# Patient Record
Sex: MALE | Race: WHITE | NOT HISPANIC OR LATINO | URBAN - METROPOLITAN AREA
[De-identification: names, ages, dates, MRNs, and addresses within clinical notes are randomized per-mention and may not be internally consistent; named-entity substitution may affect disease eponyms.]

---

## 2020-11-12 ENCOUNTER — INPATIENT (INPATIENT)
Facility: HOSPITAL | Age: 55
LOS: 5 days | Discharge: HOME | DRG: 177 | End: 2020-11-18
Attending: HOSPITALIST | Admitting: STUDENT IN AN ORGANIZED HEALTH CARE EDUCATION/TRAINING PROGRAM
Payer: COMMERCIAL

## 2020-11-12 VITALS — WEIGHT: 199.96 LBS | HEIGHT: 69 IN

## 2020-11-12 DIAGNOSIS — U07.1 COVID-19: ICD-10-CM

## 2020-11-12 LAB
ALBUMIN SERPL ELPH-MCNC: 3.4 G/DL — SIGNIFICANT CHANGE UP (ref 3.3–5)
ALP SERPL-CCNC: 55 U/L — SIGNIFICANT CHANGE UP (ref 40–120)
ALT FLD-CCNC: 30 U/L — SIGNIFICANT CHANGE UP (ref 10–45)
ANION GAP SERPL CALC-SCNC: 11 MMOL/L — SIGNIFICANT CHANGE UP (ref 5–17)
AST SERPL-CCNC: 37 U/L — SIGNIFICANT CHANGE UP (ref 10–40)
BASOPHILS # BLD AUTO: 0.01 K/UL — SIGNIFICANT CHANGE UP (ref 0–0.2)
BASOPHILS NFR BLD AUTO: 0.2 % — SIGNIFICANT CHANGE UP (ref 0–2)
BILIRUB SERPL-MCNC: 0.4 MG/DL — SIGNIFICANT CHANGE UP (ref 0.2–1.2)
BUN SERPL-MCNC: 13 MG/DL — SIGNIFICANT CHANGE UP (ref 7–23)
CALCIUM SERPL-MCNC: 8.4 MG/DL — SIGNIFICANT CHANGE UP (ref 8.4–10.5)
CHLORIDE SERPL-SCNC: 98 MMOL/L — SIGNIFICANT CHANGE UP (ref 96–108)
CO2 BLDA-SCNC: 21 MMOL/L — LOW (ref 22–30)
CO2 SERPL-SCNC: 26 MMOL/L — SIGNIFICANT CHANGE UP (ref 22–31)
CREAT SERPL-MCNC: 1.35 MG/DL — HIGH (ref 0.5–1.3)
D DIMER BLD IA.RAPID-MCNC: 222 NG/ML DDU — SIGNIFICANT CHANGE UP
EOSINOPHIL # BLD AUTO: 0.11 K/UL — SIGNIFICANT CHANGE UP (ref 0–0.5)
EOSINOPHIL NFR BLD AUTO: 2 % — SIGNIFICANT CHANGE UP (ref 0–6)
GAS PNL BLDA: SIGNIFICANT CHANGE UP
GLUCOSE SERPL-MCNC: 119 MG/DL — HIGH (ref 70–99)
HCT VFR BLD CALC: 38.7 % — LOW (ref 39–50)
HGB BLD-MCNC: 13.4 G/DL — SIGNIFICANT CHANGE UP (ref 13–17)
HOROWITZ INDEX BLDA+IHG-RTO: SIGNIFICANT CHANGE UP
IMM GRANULOCYTES NFR BLD AUTO: 0.4 % — SIGNIFICANT CHANGE UP (ref 0–1.5)
LACTATE SERPL-SCNC: 0.9 MMOL/L — SIGNIFICANT CHANGE UP (ref 0.7–2)
LYMPHOCYTES # BLD AUTO: 0.43 K/UL — LOW (ref 1–3.3)
LYMPHOCYTES # BLD AUTO: 7.9 % — LOW (ref 13–44)
MCHC RBC-ENTMCNC: 32.1 PG — SIGNIFICANT CHANGE UP (ref 27–34)
MCHC RBC-ENTMCNC: 34.6 GM/DL — SIGNIFICANT CHANGE UP (ref 32–36)
MCV RBC AUTO: 92.8 FL — SIGNIFICANT CHANGE UP (ref 80–100)
MONOCYTES # BLD AUTO: 0.16 K/UL — SIGNIFICANT CHANGE UP (ref 0–0.9)
MONOCYTES NFR BLD AUTO: 2.9 % — SIGNIFICANT CHANGE UP (ref 2–14)
NEUTROPHILS # BLD AUTO: 4.71 K/UL — SIGNIFICANT CHANGE UP (ref 1.8–7.4)
NEUTROPHILS NFR BLD AUTO: 86.6 % — HIGH (ref 43–77)
NRBC # BLD: 0 /100 WBCS — SIGNIFICANT CHANGE UP (ref 0–0)
NT-PROBNP SERPL-SCNC: 103 PG/ML — SIGNIFICANT CHANGE UP (ref 0–300)
PCO2 BLDA: 25 MMHG — LOW (ref 32–46)
PH BLDA: 7.52 — HIGH (ref 7.35–7.45)
PLATELET # BLD AUTO: 130 K/UL — LOW (ref 150–400)
PO2 BLDA: <44 MMHG — CRITICAL LOW (ref 74–108)
POTASSIUM SERPL-MCNC: 3.8 MMOL/L — SIGNIFICANT CHANGE UP (ref 3.5–5.3)
POTASSIUM SERPL-SCNC: 3.8 MMOL/L — SIGNIFICANT CHANGE UP (ref 3.5–5.3)
PROCALCITONIN SERPL-MCNC: 0.34 NG/ML — HIGH
PROT SERPL-MCNC: 7.3 G/DL — SIGNIFICANT CHANGE UP (ref 6–8.3)
RBC # BLD: 4.17 M/UL — LOW (ref 4.2–5.8)
RBC # FLD: 12.6 % — SIGNIFICANT CHANGE UP (ref 10.3–14.5)
SAO2 % BLDA: 78 % — LOW (ref 92–96)
SODIUM SERPL-SCNC: 135 MMOL/L — SIGNIFICANT CHANGE UP (ref 135–145)
TROPONIN I SERPL-MCNC: <.017 NG/ML — LOW (ref 0.02–0.06)
WBC # BLD: 5.44 K/UL — SIGNIFICANT CHANGE UP (ref 3.8–10.5)
WBC # FLD AUTO: 5.44 K/UL — SIGNIFICANT CHANGE UP (ref 3.8–10.5)

## 2020-11-12 PROCEDURE — 99291 CRITICAL CARE FIRST HOUR: CPT

## 2020-11-12 PROCEDURE — 71045 X-RAY EXAM CHEST 1 VIEW: CPT | Mod: 26

## 2020-11-12 PROCEDURE — 99223 1ST HOSP IP/OBS HIGH 75: CPT

## 2020-11-12 PROCEDURE — 93010 ELECTROCARDIOGRAM REPORT: CPT

## 2020-11-12 RX ORDER — ACETAMINOPHEN 500 MG
975 TABLET ORAL ONCE
Refills: 0 | Status: COMPLETED | OUTPATIENT
Start: 2020-11-12 | End: 2020-11-12

## 2020-11-12 RX ORDER — INFLUENZA VIRUS VACCINE 15; 15; 15; 15 UG/.5ML; UG/.5ML; UG/.5ML; UG/.5ML
0.5 SUSPENSION INTRAMUSCULAR ONCE
Refills: 0 | Status: COMPLETED | OUTPATIENT
Start: 2020-11-12 | End: 2020-11-12

## 2020-11-12 RX ORDER — IBUPROFEN 200 MG
400 TABLET ORAL ONCE
Refills: 0 | Status: COMPLETED | OUTPATIENT
Start: 2020-11-12 | End: 2020-11-12

## 2020-11-12 RX ORDER — SODIUM CHLORIDE 9 MG/ML
500 INJECTION INTRAMUSCULAR; INTRAVENOUS; SUBCUTANEOUS ONCE
Refills: 0 | Status: COMPLETED | OUTPATIENT
Start: 2020-11-12 | End: 2020-11-12

## 2020-11-12 RX ORDER — ENOXAPARIN SODIUM 100 MG/ML
40 INJECTION SUBCUTANEOUS DAILY
Refills: 0 | Status: DISCONTINUED | OUTPATIENT
Start: 2020-11-12 | End: 2020-11-18

## 2020-11-12 RX ORDER — REMDESIVIR 5 MG/ML
200 INJECTION INTRAVENOUS EVERY 24 HOURS
Refills: 0 | Status: COMPLETED | OUTPATIENT
Start: 2020-11-12 | End: 2020-11-12

## 2020-11-12 RX ORDER — PANTOPRAZOLE SODIUM 20 MG/1
40 TABLET, DELAYED RELEASE ORAL
Refills: 0 | Status: DISCONTINUED | OUTPATIENT
Start: 2020-11-12 | End: 2020-11-18

## 2020-11-12 RX ORDER — REMDESIVIR 5 MG/ML
100 INJECTION INTRAVENOUS EVERY 24 HOURS
Refills: 0 | Status: COMPLETED | OUTPATIENT
Start: 2020-11-13 | End: 2020-11-16

## 2020-11-12 RX ORDER — AZITHROMYCIN 500 MG/1
500 TABLET, FILM COATED ORAL ONCE
Refills: 0 | Status: COMPLETED | OUTPATIENT
Start: 2020-11-12 | End: 2020-11-12

## 2020-11-12 RX ORDER — SODIUM CHLORIDE 9 MG/ML
1000 INJECTION INTRAMUSCULAR; INTRAVENOUS; SUBCUTANEOUS
Refills: 0 | Status: DISCONTINUED | OUTPATIENT
Start: 2020-11-12 | End: 2020-11-16

## 2020-11-12 RX ORDER — CEFTRIAXONE 500 MG/1
1000 INJECTION, POWDER, FOR SOLUTION INTRAMUSCULAR; INTRAVENOUS ONCE
Refills: 0 | Status: COMPLETED | OUTPATIENT
Start: 2020-11-12 | End: 2020-11-12

## 2020-11-12 RX ORDER — DEXAMETHASONE 0.5 MG/5ML
6 ELIXIR ORAL EVERY 24 HOURS
Refills: 0 | Status: DISCONTINUED | OUTPATIENT
Start: 2020-11-12 | End: 2020-11-18

## 2020-11-12 RX ORDER — IBUPROFEN 200 MG
400 TABLET ORAL ONCE
Refills: 0 | Status: DISCONTINUED | OUTPATIENT
Start: 2020-11-12 | End: 2020-11-12

## 2020-11-12 RX ORDER — ACETAMINOPHEN 500 MG
650 TABLET ORAL EVERY 6 HOURS
Refills: 0 | Status: DISCONTINUED | OUTPATIENT
Start: 2020-11-12 | End: 2020-11-18

## 2020-11-12 RX ORDER — ZINC SULFATE TAB 220 MG (50 MG ZINC EQUIVALENT) 220 (50 ZN) MG
220 TAB ORAL DAILY
Refills: 0 | Status: DISCONTINUED | OUTPATIENT
Start: 2020-11-12 | End: 2020-11-18

## 2020-11-12 RX ORDER — REMDESIVIR 5 MG/ML
INJECTION INTRAVENOUS
Refills: 0 | Status: COMPLETED | OUTPATIENT
Start: 2020-11-12 | End: 2020-11-16

## 2020-11-12 RX ORDER — ASCORBIC ACID 60 MG
500 TABLET,CHEWABLE ORAL DAILY
Refills: 0 | Status: DISCONTINUED | OUTPATIENT
Start: 2020-11-12 | End: 2020-11-18

## 2020-11-12 RX ADMIN — SODIUM CHLORIDE 500 MILLILITER(S): 9 INJECTION INTRAMUSCULAR; INTRAVENOUS; SUBCUTANEOUS at 16:20

## 2020-11-12 RX ADMIN — Medication 975 MILLIGRAM(S): at 16:00

## 2020-11-12 RX ADMIN — Medication 650 MILLIGRAM(S): at 19:00

## 2020-11-12 RX ADMIN — CEFTRIAXONE 100 MILLIGRAM(S): 500 INJECTION, POWDER, FOR SOLUTION INTRAMUSCULAR; INTRAVENOUS at 16:30

## 2020-11-12 RX ADMIN — SODIUM CHLORIDE 75 MILLILITER(S): 9 INJECTION INTRAMUSCULAR; INTRAVENOUS; SUBCUTANEOUS at 18:12

## 2020-11-12 RX ADMIN — Medication 6 MILLIGRAM(S): at 21:42

## 2020-11-12 RX ADMIN — CEFTRIAXONE 1000 MILLIGRAM(S): 500 INJECTION, POWDER, FOR SOLUTION INTRAMUSCULAR; INTRAVENOUS at 17:00

## 2020-11-12 RX ADMIN — SODIUM CHLORIDE 500 MILLILITER(S): 9 INJECTION INTRAMUSCULAR; INTRAVENOUS; SUBCUTANEOUS at 15:49

## 2020-11-12 RX ADMIN — Medication 400 MILLIGRAM(S): at 22:42

## 2020-11-12 RX ADMIN — Medication 650 MILLIGRAM(S): at 20:00

## 2020-11-12 RX ADMIN — REMDESIVIR 500 MILLIGRAM(S): 5 INJECTION INTRAVENOUS at 18:12

## 2020-11-12 RX ADMIN — Medication 400 MILLIGRAM(S): at 21:42

## 2020-11-12 NOTE — H&P ADULT - ASSESSMENT
# Acute Hypoxic Respiratory Failure due to COVID-19 infection  - Monitor respiratory status closely. Oxygen supplementation as needed to maintain saturation >90%  - ID consult  - Remdesivir 5 day course per protocol  - Decadron 6mg for 10 days  - Consider Pulm eval  - Monitor fever curve, renal function    # Acute Kidney Injury  - trial of IVF at 75cc/hr for now  - Monitor BMP  - Avoid nephrotoxic medications    # DVT Prophylaxis:  - Lovenox  - Protonix   LOI LIMA is a 55y year old Male with PMH of HTN who presents to the ER complaining of worsening nausea, diarrhea and shortness of breath over the last week. PE remarkable for decreased breath sounds b/l. Labs showing elevated Cr. COVID-19 positivity. Imaging as above. Admitted for COVID-19 pneumonia, respiratory failure.     # Acute Hypoxic Respiratory Failure due to COVID-19 infection  - Monitor respiratory status closely. Oxygen supplementation as needed to maintain saturation >90%  - ID consult  - Remdesivir 5 day course per protocol  - Decadron 6mg for 10 days  - Consider Pulm eval  - Monitor fever curve, renal function    # Acute Kidney Injury  - trial of IVF at 75cc/hr for now  - Monitor BMP  - Avoid nephrotoxic medications    # Hypertension   - Hold home norvasc    # DVT Prophylaxis:  - Lovenox 40mg sc, D-dimer wnl.   - Protonix    Spoke to wife Raz, [phone], she is updated on patient's condition and plan of care    IMPROVE VTE Individual Risk Assessment          RISK                                                          Points  [  ] Previous VTE                                                3  [  ] Thrombophilia                                             2  [  ] Lower limb paralysis                                   2        (unable to hold up >15 seconds)    [  ] Current Cancer                                             2         (within 6 months)  [  ] Immobilization > 24 hrs                              1  [  ] ICU/CCU stay > 24 hours                             1  [  ] Age > 60                                                         1    IMPROVE VTE Score:         [     0    ]

## 2020-11-12 NOTE — ED PROVIDER NOTE - OBJECTIVE STATEMENT
Pt is 56 y/o male with no significant PMhx here for eval of sob. Pt was diagnosed with COVID 6 days ago - about 10 days ago started with symptoms of nausea, vomiting and diarrhea which has since improved, now with non-productive cough and sob, christian, sx worse with walking or any exertion. Pt has also been running fever with Tmax of 102 since the sx started 10 days ago. Denies cp, orthopnea, denies recent travel/prolonged immobilization, personal/family hx of coagulopathies, non smoker. pt has been taking Tylenol  for fever, last dose yesterday. PCP - CXR Oscar.

## 2020-11-12 NOTE — ED PROVIDER NOTE - CLINICAL SUMMARY MEDICAL DECISION MAKING FREE TEXT BOX
Dr. Torres: 55M no PMHx, diagnosed with COVID 6 days ago, had 10 days of nausea, vomiting, diarrhea and now 3-4 days of fevers, chills and 2 days of sob, no chest pain. On exam pt is mildly ill appearing, tachypneic but speaking in full sentences, RRR, CTAB, abdo soft/nt/nd, no pedal edema or calf ttp. Desat to 92% only. Unable to obtain ABG - only VBG obtained. Will admit, give decadron.

## 2020-11-12 NOTE — H&P ADULT - HISTORY OF PRESENT ILLNESS
LOI LIMA is a 55y year old Male with PMH of HTN who presents to the ER complaining of worsening nausea, diarrhea and shortness of breath over the last week.    Patient reports symptoms started 11/3 while at work, suddenly felt fatigued and had chills. Symptoms worsened the next day and he went to get tested for COVID-19 which resulted positive. Since then he has been home away from friends, in a separate room from wife and 2 kids. Has been monitoring saturation at home with pulse ox, today it fell in low 90s which prompted him to seek medical attention. Reports no reduction in fevers since onset.

## 2020-11-12 NOTE — ED PROVIDER NOTE - ATTENDING CONTRIBUTION TO CARE
Dr. Torres: I performed a face to face bedside interview with patient regarding history of present illness, review of symptoms and past medical history. I completed an independent physical exam.  I have discussed patient's plan of care with PA.   I agree with note as stated above, having amended the EMR as needed to reflect my findings.   This includes HISTORY OF PRESENT ILLNESS, HIV, PAST MEDICAL/SURGICAL/FAMILY/SOCIAL HISTORY, ALLERGIES AND HOME MEDICATIONS, REVIEW OF SYSTEMS, PHYSICAL EXAM, and any PROGRESS NOTES during the time I functioned as the attending physician for this patient.    see mdm

## 2020-11-12 NOTE — H&P ADULT - NSHPSOCIALHISTORY_GEN_ALL_CORE
Denies smoking, alcohol or drug use  Lives with wife and 2 children  Baseline ambulation: Ambulates without assistance, ET unlimited  Independent in all ADLs and IADLs

## 2020-11-12 NOTE — ED PROVIDER NOTE - PROGRESS NOTE DETAILS
TALIA Cordero - pt noted to be febrile over 102, sepsis bundle initiated but no sepsis fluids given as pt is known covid pos.

## 2020-11-12 NOTE — ED PROVIDER NOTE - CARE PLAN
Principal Discharge DX:	COVID-19   Principal Discharge DX:	COVID-19  Secondary Diagnosis:	Hypoxia  Secondary Diagnosis:	Dyspnea, unspecified type

## 2020-11-12 NOTE — H&P ADULT - NSHPPHYSICALEXAM_GEN_ALL_CORE
PHYSICAL EXAM:  GENERAL: Tired appearing middle aged male lying in bed, NAD, well-groomed, well-developed  NERVOUS SYSTEM:  Alert & Oriented X3, Good concentration; Motor Strength 5/5 B/L upper and lower extremities; DTRs 2+ intact and symmetric  HEAD:  Atraumatic, Normocephalic  EYES: EOMI, PERRLA, conjunctiva and sclera clear  ENMT: No tonsillar erythema, exudates, or enlargement; Moist mucous membranes, Good dentition, No lesions  NECK: Supple, No JVD, Normal thyroid  CHEST/LUNG: Fair air entry bilaterally, decreased breath sounds at bases. No wheezing. Saturating 95% on 2L nasal cannula  HEART: Regular rate and rhythm; No murmurs, rubs, or gallops  ABDOMEN: Soft, Nontender, Nondistended; Bowel sounds present  EXTREMITIES:  2+ Peripheral Pulses, No clubbing, cyanosis, or edema  LYMPH: No lymphadenopathy noted  SKIN: No rashes or lesions

## 2020-11-13 LAB
ALBUMIN SERPL ELPH-MCNC: 3.1 G/DL — LOW (ref 3.3–5)
ALP SERPL-CCNC: 51 U/L — SIGNIFICANT CHANGE UP (ref 40–120)
ALT FLD-CCNC: 32 U/L — SIGNIFICANT CHANGE UP (ref 10–45)
ANION GAP SERPL CALC-SCNC: 10 MMOL/L — SIGNIFICANT CHANGE UP (ref 5–17)
AST SERPL-CCNC: 38 U/L — SIGNIFICANT CHANGE UP (ref 10–40)
BILIRUB DIRECT SERPL-MCNC: 0 MG/DL — SIGNIFICANT CHANGE UP (ref 0–0.2)
BILIRUB INDIRECT FLD-MCNC: 0.3 MG/DL — SIGNIFICANT CHANGE UP (ref 0.2–1)
BILIRUB SERPL-MCNC: 0.3 MG/DL — SIGNIFICANT CHANGE UP (ref 0.2–1.2)
BUN SERPL-MCNC: 13 MG/DL — SIGNIFICANT CHANGE UP (ref 7–23)
CALCIUM SERPL-MCNC: 8.5 MG/DL — SIGNIFICANT CHANGE UP (ref 8.4–10.5)
CHLORIDE SERPL-SCNC: 103 MMOL/L — SIGNIFICANT CHANGE UP (ref 96–108)
CO2 SERPL-SCNC: 25 MMOL/L — SIGNIFICANT CHANGE UP (ref 22–31)
CREAT SERPL-MCNC: 1.14 MG/DL — SIGNIFICANT CHANGE UP (ref 0.5–1.3)
CRP SERPL-MCNC: 15.05 MG/DL — HIGH (ref 0–0.4)
CRP SERPL-MCNC: 16.69 MG/DL — HIGH (ref 0–0.4)
ERYTHROCYTE [SEDIMENTATION RATE] IN BLOOD: 48 MM/HR — HIGH (ref 0–20)
FERRITIN SERPL-MCNC: 6577 NG/ML — HIGH (ref 30–400)
GLUCOSE SERPL-MCNC: 138 MG/DL — HIGH (ref 70–99)
HCT VFR BLD CALC: 38.4 % — LOW (ref 39–50)
HGB BLD-MCNC: 13.1 G/DL — SIGNIFICANT CHANGE UP (ref 13–17)
MCHC RBC-ENTMCNC: 32 PG — SIGNIFICANT CHANGE UP (ref 27–34)
MCHC RBC-ENTMCNC: 34.1 GM/DL — SIGNIFICANT CHANGE UP (ref 32–36)
MCV RBC AUTO: 93.9 FL — SIGNIFICANT CHANGE UP (ref 80–100)
NRBC # BLD: 0 /100 WBCS — SIGNIFICANT CHANGE UP (ref 0–0)
PLATELET # BLD AUTO: 139 K/UL — LOW (ref 150–400)
POTASSIUM SERPL-MCNC: 4.3 MMOL/L — SIGNIFICANT CHANGE UP (ref 3.5–5.3)
POTASSIUM SERPL-SCNC: 4.3 MMOL/L — SIGNIFICANT CHANGE UP (ref 3.5–5.3)
PROT SERPL-MCNC: 7 G/DL — SIGNIFICANT CHANGE UP (ref 6–8.3)
RBC # BLD: 4.09 M/UL — LOW (ref 4.2–5.8)
RBC # FLD: 12.5 % — SIGNIFICANT CHANGE UP (ref 10.3–14.5)
SARS-COV-2 IGG SERPL QL IA: NEGATIVE — SIGNIFICANT CHANGE UP
SARS-COV-2 IGM SERPL IA-ACNC: 0.68 INDEX — SIGNIFICANT CHANGE UP
SARS-COV-2 RNA SPEC QL NAA+PROBE: DETECTED
SODIUM SERPL-SCNC: 138 MMOL/L — SIGNIFICANT CHANGE UP (ref 135–145)
VIT D25+D1,25 OH+D1,25 PNL SERPL-MCNC: 126 PG/ML — HIGH (ref 19.9–79.3)
WBC # BLD: 5.34 K/UL — SIGNIFICANT CHANGE UP (ref 3.8–10.5)
WBC # FLD AUTO: 5.34 K/UL — SIGNIFICANT CHANGE UP (ref 3.8–10.5)

## 2020-11-13 PROCEDURE — 99233 SBSQ HOSP IP/OBS HIGH 50: CPT

## 2020-11-13 RX ADMIN — Medication 6 MILLIGRAM(S): at 17:12

## 2020-11-13 RX ADMIN — ZINC SULFATE TAB 220 MG (50 MG ZINC EQUIVALENT) 220 MILLIGRAM(S): 220 (50 ZN) TAB at 11:05

## 2020-11-13 RX ADMIN — SODIUM CHLORIDE 75 MILLILITER(S): 9 INJECTION INTRAMUSCULAR; INTRAVENOUS; SUBCUTANEOUS at 11:04

## 2020-11-13 RX ADMIN — Medication 1 TABLET(S): at 11:05

## 2020-11-13 RX ADMIN — PANTOPRAZOLE SODIUM 40 MILLIGRAM(S): 20 TABLET, DELAYED RELEASE ORAL at 05:04

## 2020-11-13 RX ADMIN — Medication 500 MILLIGRAM(S): at 11:04

## 2020-11-13 RX ADMIN — REMDESIVIR 500 MILLIGRAM(S): 5 INJECTION INTRAVENOUS at 18:09

## 2020-11-13 RX ADMIN — ENOXAPARIN SODIUM 40 MILLIGRAM(S): 100 INJECTION SUBCUTANEOUS at 11:05

## 2020-11-13 NOTE — PROGRESS NOTE ADULT - SUBJECTIVE AND OBJECTIVE BOX
Patient is a 55y old  Male who presents with a chief complaint of worsening nausea, diarrhea, shortness of breath (12 Nov 2020 17:15)    SOB and c/o cough.      Patient seen and examined at bedside.    ALLERGIES:  No Known Allergies    MEDICATIONS  (STANDING):  ascorbic acid 500 milliGRAM(s) Oral daily  dexAMETHasone  Injectable 6 milliGRAM(s) IV Push every 24 hours  enoxaparin Injectable 40 milliGRAM(s) SubCutaneous daily  influenza   Vaccine 0.5 milliLiter(s) IntraMuscular once  multivitamin 1 Tablet(s) Oral daily  pantoprazole    Tablet 40 milliGRAM(s) Oral before breakfast  remdesivir  IVPB 100 milliGRAM(s) IV Intermittent every 24 hours  remdesivir  IVPB   IV Intermittent   sodium chloride 0.9%. 1000 milliLiter(s) (75 mL/Hr) IV Continuous <Continuous>  zinc sulfate 220 milliGRAM(s) Oral daily    MEDICATIONS  (PRN):  acetaminophen   Tablet .. 650 milliGRAM(s) Oral every 6 hours PRN Temp greater or equal to 38C (100.4F), Mild Pain (1 - 3), Moderate Pain (4 - 6)    Vital Signs Last 24 Hrs  T(F): 98.8 (12 Nov 2020 22:42), Max: 102.9 (12 Nov 2020 20:52)  HR: 85 (12 Nov 2020 20:52) (80 - 89)  BP: 115/69 (12 Nov 2020 20:52) (105/67 - 134/71)  RR: 19 (12 Nov 2020 20:52) (18 - 29)  SpO2: 91% (12 Nov 2020 20:52) (91% - 98%)  I&O's Summary    12 Nov 2020 07:01  -  13 Nov 2020 07:00  --------------------------------------------------------  IN: 1140 mL / OUT: 0 mL / NET: 1140 mL      BMI (kg/m2): 32.9 (11-12-20 @ 18:37)  PHYSICAL EXAM:  General: NAD, appears tired   ENT: MMM, no scleral icterus  Neck: Supple, No JVD  Lungs: bilateral rhonchi   Cardio: RRR, S1/S2, No murmurs  Abdomen: Soft, Nontender, Nondistended; Bowel sounds present  Extremities: No calf tenderness, No pitting edema    LABS:                        13.4   5.44  )-----------( 130      ( 12 Nov 2020 15:30 )             38.7       11-12    135  |  98  |  13  ----------------------------<  119  3.8   |  26  |  1.35    Ca    8.4      12 Nov 2020 15:30    TPro  7.3  /  Alb  3.4  /  TBili  0.4  /  DBili  x   /  AST  37  /  ALT  30  /  AlkPhos  55  11-12     eGFR if Non African American: 59 mL/min/1.73M2 (11-12-20 @ 15:30)  eGFR if : 68 mL/min/1.73M2 (11-12-20 @ 15:30)    Lactate, Blood: 0.9 mmol/L (11-12 @ 16:10)    CARDIAC MARKERS ( 12 Nov 2020 15:30 )  <.017 ng/mL / x     / x     / x     / x        ABG - ( 12 Nov 2020 15:58 )  pH, Arterial: 7.52  pH, Blood: x     /  pCO2: 25    /  pO2: <44   / HCO3: x     / Base Excess: x     /  SaO2: 78        RADIOLOGY & ADDITIONAL TESTS:    Care Discussed with Consultants/Other Providers: YES

## 2020-11-13 NOTE — PROGRESS NOTE ADULT - ASSESSMENT
LOI LIMA is a 55y year old Male with PMH of HTN who presents to the ER complaining of worsening nausea, diarrhea and shortness of breath over the last week. PE remarkable for decreased breath sounds b/l. Labs showing elevated Cr. COVID-19 positivity. Imaging as above. Admitted for COVID-19 pneumonia, respiratory failure.     COVID 19 Pneumonia   Acute Hypoxic Respiratory Failure due to COVID-19 pneumonia  - Monitor Oxygen saturation closely  - fevers overnight to 102.9   - ID consulted   - Remdesivir day 2 of 5  - Decadron 6mg IV day 2 of 10    Suspected Acute Kidney Injury on CKD Stage 2  - follow up BMP this AM   - Avoid nephrotoxic medications    Hypertension  - Hold home norvasc    DVT Prophylaxis  - Lovenox 40mg sc, D-dimer wnl   - Protonix    Spoke to wife Raz, [phone], she is updated on patient's condition and plan of care

## 2020-11-14 LAB
ALBUMIN SERPL ELPH-MCNC: 2.8 G/DL — LOW (ref 3.3–5)
ALP SERPL-CCNC: 45 U/L — SIGNIFICANT CHANGE UP (ref 40–120)
ALT FLD-CCNC: 33 U/L — SIGNIFICANT CHANGE UP (ref 10–45)
ANION GAP SERPL CALC-SCNC: 9 MMOL/L — SIGNIFICANT CHANGE UP (ref 5–17)
AST SERPL-CCNC: 34 U/L — SIGNIFICANT CHANGE UP (ref 10–40)
BILIRUB SERPL-MCNC: 0.2 MG/DL — SIGNIFICANT CHANGE UP (ref 0.2–1.2)
BUN SERPL-MCNC: 14 MG/DL — SIGNIFICANT CHANGE UP (ref 7–23)
CALCIUM SERPL-MCNC: 8.6 MG/DL — SIGNIFICANT CHANGE UP (ref 8.4–10.5)
CHLORIDE SERPL-SCNC: 105 MMOL/L — SIGNIFICANT CHANGE UP (ref 96–108)
CO2 SERPL-SCNC: 25 MMOL/L — SIGNIFICANT CHANGE UP (ref 22–31)
CREAT SERPL-MCNC: 1.13 MG/DL — SIGNIFICANT CHANGE UP (ref 0.5–1.3)
GLUCOSE SERPL-MCNC: 158 MG/DL — HIGH (ref 70–99)
HCT VFR BLD CALC: 37.3 % — LOW (ref 39–50)
HGB BLD-MCNC: 12.3 G/DL — LOW (ref 13–17)
MCHC RBC-ENTMCNC: 31.2 PG — SIGNIFICANT CHANGE UP (ref 27–34)
MCHC RBC-ENTMCNC: 33 GM/DL — SIGNIFICANT CHANGE UP (ref 32–36)
MCV RBC AUTO: 94.7 FL — SIGNIFICANT CHANGE UP (ref 80–100)
NRBC # BLD: 0 /100 WBCS — SIGNIFICANT CHANGE UP (ref 0–0)
PLATELET # BLD AUTO: 167 K/UL — SIGNIFICANT CHANGE UP (ref 150–400)
POTASSIUM SERPL-MCNC: 4.3 MMOL/L — SIGNIFICANT CHANGE UP (ref 3.5–5.3)
POTASSIUM SERPL-SCNC: 4.3 MMOL/L — SIGNIFICANT CHANGE UP (ref 3.5–5.3)
PROT SERPL-MCNC: 6.5 G/DL — SIGNIFICANT CHANGE UP (ref 6–8.3)
RBC # BLD: 3.94 M/UL — LOW (ref 4.2–5.8)
RBC # FLD: 13.3 % — SIGNIFICANT CHANGE UP (ref 10.3–14.5)
SODIUM SERPL-SCNC: 139 MMOL/L — SIGNIFICANT CHANGE UP (ref 135–145)
WBC # BLD: 5.43 K/UL — SIGNIFICANT CHANGE UP (ref 3.8–10.5)
WBC # FLD AUTO: 5.43 K/UL — SIGNIFICANT CHANGE UP (ref 3.8–10.5)

## 2020-11-14 PROCEDURE — 99233 SBSQ HOSP IP/OBS HIGH 50: CPT

## 2020-11-14 RX ADMIN — ENOXAPARIN SODIUM 40 MILLIGRAM(S): 100 INJECTION SUBCUTANEOUS at 11:11

## 2020-11-14 RX ADMIN — ZINC SULFATE TAB 220 MG (50 MG ZINC EQUIVALENT) 220 MILLIGRAM(S): 220 (50 ZN) TAB at 11:11

## 2020-11-14 RX ADMIN — REMDESIVIR 500 MILLIGRAM(S): 5 INJECTION INTRAVENOUS at 17:03

## 2020-11-14 RX ADMIN — SODIUM CHLORIDE 75 MILLILITER(S): 9 INJECTION INTRAMUSCULAR; INTRAVENOUS; SUBCUTANEOUS at 03:22

## 2020-11-14 RX ADMIN — Medication 1 TABLET(S): at 11:11

## 2020-11-14 RX ADMIN — Medication 500 MILLIGRAM(S): at 11:11

## 2020-11-14 RX ADMIN — PANTOPRAZOLE SODIUM 40 MILLIGRAM(S): 20 TABLET, DELAYED RELEASE ORAL at 06:41

## 2020-11-14 RX ADMIN — Medication 6 MILLIGRAM(S): at 17:02

## 2020-11-14 NOTE — PROGRESS NOTE ADULT - ASSESSMENT
LOI LIMA is a 55y year old Male with PMH of HTN who presents to the ER complaining of worsening nausea, diarrhea and shortness of breath over the last week. PE remarkable for decreased breath sounds b/l. Labs showing elevated Cr. COVID-19 positivity. Imaging as above. Admitted for COVID-19 pneumonia, respiratory failure.     COVID 19 Pneumonia   Acute Hypoxic Respiratory Failure due to COVID-19 pneumonia  - Monitor Oxygen saturation closely  - fevers overnight to 102.9   - ID consulted   - Remdesivir day 3 of 5  - Decadron 6mg IV day 3 of 10    Thrombocytopenia secondary to above    monitor platelets     Suspected Acute Kidney Injury on CKD Stage 2  - follow up creatinine today   - Avoid nephrotoxic medications    Hypertension  - Hold home norvasc    DVT Prophylaxis  - Lovenox 40mg sc, D-dimer wnl   - Protonix    Spoke to wife Raz, [phone].

## 2020-11-14 NOTE — PROGRESS NOTE ADULT - SUBJECTIVE AND OBJECTIVE BOX
Patient is a 55y old  Male who presents with a chief complaint of worsening nausea, diarrhea, shortness of breath (13 Nov 2020 07:06)    Feels okay. Denies chest pain.  + SOB and cough.  Slept okay.     Patient seen and examined at bedside.    ALLERGIES:  No Known Allergies    MEDICATIONS  (STANDING):  ascorbic acid 500 milliGRAM(s) Oral daily  dexAMETHasone  Injectable 6 milliGRAM(s) IV Push every 24 hours  enoxaparin Injectable 40 milliGRAM(s) SubCutaneous daily  influenza   Vaccine 0.5 milliLiter(s) IntraMuscular once  multivitamin 1 Tablet(s) Oral daily  pantoprazole    Tablet 40 milliGRAM(s) Oral before breakfast  remdesivir  IVPB 100 milliGRAM(s) IV Intermittent every 24 hours  remdesivir  IVPB   IV Intermittent   sodium chloride 0.9%. 1000 milliLiter(s) (75 mL/Hr) IV Continuous <Continuous>  zinc sulfate 220 milliGRAM(s) Oral daily    MEDICATIONS  (PRN):  acetaminophen   Tablet .. 650 milliGRAM(s) Oral every 6 hours PRN Temp greater or equal to 38C (100.4F), Mild Pain (1 - 3), Moderate Pain (4 - 6)    Vital Signs Last 24 Hrs  T(F): 99.5 (13 Nov 2020 20:00), Max: 99.9 (13 Nov 2020 16:10)  HR: 75 (13 Nov 2020 20:00) (72 - 75)  BP: 120/77 (13 Nov 2020 20:00) (113/72 - 120/77)  RR: 20 (13 Nov 2020 20:00) (20 - 20)  SpO2: 94% (13 Nov 2020 20:00) (93% - 94%)  I&O's Summary    13 Nov 2020 07:01  -  14 Nov 2020 07:00  --------------------------------------------------------  IN: 1675 mL / OUT: 0 mL / NET: 1675 mL      BMI (kg/m2): 32.9 (11-12-20 @ 18:37)  PHYSICAL EXAM:  General: NAD, A/O x 3  ENT: MMM, no scleral icterus  Neck: Supple, No JVD  Lungs: Clear to auscultation bilaterally, no wheezes, rales, rhonchi  Cardio: RRR, S1/S2, No murmurs  Abdomen: Soft, Nontender, Nondistended; Bowel sounds present  Extremities: No calf tenderness, No pitting edema    LABS:                        13.1   5.34  )-----------( 139      ( 13 Nov 2020 05:50 )             38.4       11-13    138  |  103  |  13  ----------------------------<  138  4.3   |  25  |  1.14    Ca    8.5      13 Nov 2020 05:50    TPro  7.0  /  Alb  3.1  /  TBili  0.3  /  DBili  0.0  /  AST  38  /  ALT  32  /  AlkPhos  51  11-13     eGFR if African American: 84 mL/min/1.73M2 (11-13-20 @ 05:50)  eGFR if Non African American: 73 mL/min/1.73M2 (11-13-20 @ 05:50)       Lactate, Blood: 0.9 mmol/L (11-12 @ 16:10)    CARDIAC MARKERS ( 12 Nov 2020 15:30 )  <.017 ng/mL / x     / x     / x     / x        ABG - ( 12 Nov 2020 15:58 )  pH, Arterial: 7.52  pH, Blood: x     /  pCO2: 25    /  pO2: <44   / HCO3: x     / Base Excess: x     /  SaO2: 78        Culture - Blood (collected 12 Nov 2020 16:10)  Source: .Blood Blood-Peripheral  Preliminary Report (13 Nov 2020 22:02):    No growth to date.    Culture - Blood (collected 12 Nov 2020 16:10)  Source: .Blood Blood-Peripheral  Preliminary Report (13 Nov 2020 22:02):    No growth to date.        RADIOLOGY & ADDITIONAL TESTS:    Care Discussed with Consultants/Other Providers:

## 2020-11-14 NOTE — DIETITIAN INITIAL EVALUATION ADULT. - ADD RECOMMEND
Honor pt's food preferences as feasible with prescribed diet. Obtain and record PO intake and weights daily

## 2020-11-14 NOTE — DIETITIAN INITIAL EVALUATION ADULT. - OTHER INFO
H&P: Patient is a 54 y/o  Male who presents with a chief complaint of worsening nausea, diarrhea, shortness of breath. Covid19+ c/o SOB, w/ hypoxia. PMHx HTN. H&P: Patient is a 56 y/o  Male who presents with a chief complaint of worsening nausea, diarrhea, shortness of breath. Covid19+ c/o SOB, w/ hypoxia. PMHx HTN.  Pt reports improved appetite. PO intake 50-75%. Pt feed self, reports chewing difficulties, prefers soft foods, no swallowing problems. NKFA. Denies N/V/C c/o diarrhea x 1. Last BM: 11/14. UBW: 203 Lbs, Current wt: 216 Lbs. Discussed about soft consistency diet for easy chewing. Explained the importance of consuming plenty fluids s/p diarrhea for several days. Will provide water bottles at every meal. Pt agreed w/ recommendations.

## 2020-11-15 LAB
ANION GAP SERPL CALC-SCNC: 8 MMOL/L — SIGNIFICANT CHANGE UP (ref 5–17)
BUN SERPL-MCNC: 14 MG/DL — SIGNIFICANT CHANGE UP (ref 7–23)
CALCIUM SERPL-MCNC: 8.7 MG/DL — SIGNIFICANT CHANGE UP (ref 8.4–10.5)
CHLORIDE SERPL-SCNC: 106 MMOL/L — SIGNIFICANT CHANGE UP (ref 96–108)
CO2 SERPL-SCNC: 26 MMOL/L — SIGNIFICANT CHANGE UP (ref 22–31)
CREAT SERPL-MCNC: 1.01 MG/DL — SIGNIFICANT CHANGE UP (ref 0.5–1.3)
GLUCOSE SERPL-MCNC: 124 MG/DL — HIGH (ref 70–99)
HCT VFR BLD CALC: 35.8 % — LOW (ref 39–50)
HGB BLD-MCNC: 12.2 G/DL — LOW (ref 13–17)
MCHC RBC-ENTMCNC: 31.9 PG — SIGNIFICANT CHANGE UP (ref 27–34)
MCHC RBC-ENTMCNC: 34.1 GM/DL — SIGNIFICANT CHANGE UP (ref 32–36)
MCV RBC AUTO: 93.7 FL — SIGNIFICANT CHANGE UP (ref 80–100)
NRBC # BLD: 0 /100 WBCS — SIGNIFICANT CHANGE UP (ref 0–0)
PLATELET # BLD AUTO: 196 K/UL — SIGNIFICANT CHANGE UP (ref 150–400)
POTASSIUM SERPL-MCNC: 4.1 MMOL/L — SIGNIFICANT CHANGE UP (ref 3.5–5.3)
POTASSIUM SERPL-SCNC: 4.1 MMOL/L — SIGNIFICANT CHANGE UP (ref 3.5–5.3)
RBC # BLD: 3.82 M/UL — LOW (ref 4.2–5.8)
RBC # FLD: 13.2 % — SIGNIFICANT CHANGE UP (ref 10.3–14.5)
SODIUM SERPL-SCNC: 140 MMOL/L — SIGNIFICANT CHANGE UP (ref 135–145)
WBC # BLD: 5.75 K/UL — SIGNIFICANT CHANGE UP (ref 3.8–10.5)
WBC # FLD AUTO: 5.75 K/UL — SIGNIFICANT CHANGE UP (ref 3.8–10.5)

## 2020-11-15 PROCEDURE — 99233 SBSQ HOSP IP/OBS HIGH 50: CPT

## 2020-11-15 RX ADMIN — ZINC SULFATE TAB 220 MG (50 MG ZINC EQUIVALENT) 220 MILLIGRAM(S): 220 (50 ZN) TAB at 11:43

## 2020-11-15 RX ADMIN — Medication 1 TABLET(S): at 11:42

## 2020-11-15 RX ADMIN — ENOXAPARIN SODIUM 40 MILLIGRAM(S): 100 INJECTION SUBCUTANEOUS at 11:42

## 2020-11-15 RX ADMIN — PANTOPRAZOLE SODIUM 40 MILLIGRAM(S): 20 TABLET, DELAYED RELEASE ORAL at 06:07

## 2020-11-15 RX ADMIN — Medication 6 MILLIGRAM(S): at 17:06

## 2020-11-15 RX ADMIN — REMDESIVIR 500 MILLIGRAM(S): 5 INJECTION INTRAVENOUS at 17:06

## 2020-11-15 RX ADMIN — Medication 500 MILLIGRAM(S): at 11:42

## 2020-11-15 NOTE — PROGRESS NOTE ADULT - SUBJECTIVE AND OBJECTIVE BOX
Patient is a 55y old  Male who presents with a chief complaint of worsening nausea, diarrhea, shortness of breath (14 Nov 2020 09:59)      Patient seen and examined at bedside.    ALLERGIES:  No Known Allergies    MEDICATIONS  (STANDING):  ascorbic acid 500 milliGRAM(s) Oral daily  dexAMETHasone  Injectable 6 milliGRAM(s) IV Push every 24 hours  enoxaparin Injectable 40 milliGRAM(s) SubCutaneous daily  influenza   Vaccine 0.5 milliLiter(s) IntraMuscular once  multivitamin 1 Tablet(s) Oral daily  pantoprazole    Tablet 40 milliGRAM(s) Oral before breakfast  remdesivir  IVPB 100 milliGRAM(s) IV Intermittent every 24 hours  remdesivir  IVPB   IV Intermittent   sodium chloride 0.9%. 1000 milliLiter(s) (75 mL/Hr) IV Continuous <Continuous>  zinc sulfate 220 milliGRAM(s) Oral daily    MEDICATIONS  (PRN):  acetaminophen   Tablet .. 650 milliGRAM(s) Oral every 6 hours PRN Temp greater or equal to 38C (100.4F), Mild Pain (1 - 3), Moderate Pain (4 - 6)    Vital Signs Last 24 Hrs  T(F): 98.6 (14 Nov 2020 20:07), Max: 98.6 (14 Nov 2020 20:07)  HR: 62 (14 Nov 2020 20:07) (62 - 77)  BP: 157/80 (14 Nov 2020 20:07) (111/66 - 157/80)  RR: 17 (14 Nov 2020 20:07) (16 - 19)  SpO2: 94% (14 Nov 2020 20:07) (94% - 98%)  I&O's Summary    14 Nov 2020 07:01  -  15 Nov 2020 07:00  --------------------------------------------------------  IN: 2450 mL / OUT: 0 mL / NET: 2450 mL      BMI (kg/m2): 32.9 (11-12-20 @ 18:37)  PHYSICAL EXAM:  General: NAD, A/O x 3  ENT: MMM, no scleral icterus  Neck: Supple, No JVD  Lungs: Clear to auscultation bilaterally, no wheezes, rales, rhonchi  Cardio: RRR, S1/S2, No murmurs  Abdomen: Soft, Nontender, Nondistended; Bowel sounds present  Extremities: No calf tenderness, No pitting edema    LABS:                        12.3   5.43  )-----------( 167      ( 14 Nov 2020 05:45 )             37.3       11-14    139  |  105  |  14  ----------------------------<  158  4.3   |  25  |  1.13    Ca    8.6      14 Nov 2020 05:45    TPro  6.5  /  Alb  2.8  /  TBili  0.2  /  DBili  x   /  AST  34  /  ALT  33  /  AlkPhos  45  11-14     eGFR if Non African American: 73 mL/min/1.73M2 (11-14-20 @ 05:45)  eGFR if African American: 85 mL/min/1.73M2 (11-14-20 @ 05:45)       Lactate, Blood: 0.9 mmol/L (11-12 @ 16:10)    CARDIAC MARKERS ( 12 Nov 2020 15:30 )  <.017 ng/mL / x     / x     / x     / x        ABG - ( 12 Nov 2020 15:58 )  pH, Arterial: 7.52  pH, Blood: x     /  pCO2: 25    /  pO2: <44   / HCO3: x     / Base Excess: x     /  SaO2: 78        Culture - Blood (collected 12 Nov 2020 16:10)  Source: .Blood Blood-Peripheral  Preliminary Report (13 Nov 2020 22:02):    No growth to date.    Culture - Blood (collected 12 Nov 2020 16:10)  Source: .Blood Blood-Peripheral  Preliminary Report (13 Nov 2020 22:02):    No growth to date.    RADIOLOGY & ADDITIONAL TESTS:    Care Discussed with Consultants/Other Providers:

## 2020-11-15 NOTE — PROGRESS NOTE ADULT - ASSESSMENT
LOI LIMA is a 55y year old Male with PMH of HTN who presents to the ER complaining of worsening nausea, diarrhea and shortness of breath over the last week. PE remarkable for decreased breath sounds b/l. Labs showing elevated Cr. COVID-19 positivity. Imaging as above. Admitted for COVID-19 pneumonia, respiratory failure.     COVID 19 Pneumonia   Acute Hypoxic Respiratory Failure due to COVID-19 pneumonia  - currently on 2 L NC  - Monitor Oxygen saturation closely  - afebrile overnight   - Remdesivir day 4 of 5  - Decadron 6mg IV day 4 of 10    Thrombocytopenia secondary to above - resolved    monitor platelets     Suspected Acute Kidney Injury on CKD Stage 2  - follow up creatinine today   - slowly improving   - Avoid nephrotoxic medications    Hypertension  - Hold home norvasc    DVT Prophylaxis  - Lovenox 40mg sc, D-dimer wnl   - Protonix    Will discuss with wife Raz, [phone].

## 2020-11-16 LAB
ALBUMIN SERPL ELPH-MCNC: 2.7 G/DL — LOW (ref 3.3–5)
ALP SERPL-CCNC: 52 U/L — SIGNIFICANT CHANGE UP (ref 40–120)
ALT FLD-CCNC: 141 U/L — HIGH (ref 10–45)
ANION GAP SERPL CALC-SCNC: 9 MMOL/L — SIGNIFICANT CHANGE UP (ref 5–17)
AST SERPL-CCNC: 85 U/L — HIGH (ref 10–40)
BILIRUB SERPL-MCNC: 0.3 MG/DL — SIGNIFICANT CHANGE UP (ref 0.2–1.2)
BUN SERPL-MCNC: 14 MG/DL — SIGNIFICANT CHANGE UP (ref 7–23)
CALCIUM SERPL-MCNC: 8.3 MG/DL — LOW (ref 8.4–10.5)
CHLORIDE SERPL-SCNC: 106 MMOL/L — SIGNIFICANT CHANGE UP (ref 96–108)
CO2 SERPL-SCNC: 25 MMOL/L — SIGNIFICANT CHANGE UP (ref 22–31)
CREAT SERPL-MCNC: 1 MG/DL — SIGNIFICANT CHANGE UP (ref 0.5–1.3)
GLUCOSE SERPL-MCNC: 145 MG/DL — HIGH (ref 70–99)
HCT VFR BLD CALC: 34.1 % — LOW (ref 39–50)
HGB BLD-MCNC: 11.5 G/DL — LOW (ref 13–17)
MCHC RBC-ENTMCNC: 31.8 PG — SIGNIFICANT CHANGE UP (ref 27–34)
MCHC RBC-ENTMCNC: 33.7 GM/DL — SIGNIFICANT CHANGE UP (ref 32–36)
MCV RBC AUTO: 94.2 FL — SIGNIFICANT CHANGE UP (ref 80–100)
NRBC # BLD: 0 /100 WBCS — SIGNIFICANT CHANGE UP (ref 0–0)
PLATELET # BLD AUTO: 204 K/UL — SIGNIFICANT CHANGE UP (ref 150–400)
POTASSIUM SERPL-MCNC: 4.1 MMOL/L — SIGNIFICANT CHANGE UP (ref 3.5–5.3)
POTASSIUM SERPL-SCNC: 4.1 MMOL/L — SIGNIFICANT CHANGE UP (ref 3.5–5.3)
PROT SERPL-MCNC: 6.1 G/DL — SIGNIFICANT CHANGE UP (ref 6–8.3)
RBC # BLD: 3.62 M/UL — LOW (ref 4.2–5.8)
RBC # FLD: 13.2 % — SIGNIFICANT CHANGE UP (ref 10.3–14.5)
SODIUM SERPL-SCNC: 140 MMOL/L — SIGNIFICANT CHANGE UP (ref 135–145)
WBC # BLD: 4.14 K/UL — SIGNIFICANT CHANGE UP (ref 3.8–10.5)
WBC # FLD AUTO: 4.14 K/UL — SIGNIFICANT CHANGE UP (ref 3.8–10.5)

## 2020-11-16 PROCEDURE — 99232 SBSQ HOSP IP/OBS MODERATE 35: CPT

## 2020-11-16 RX ADMIN — Medication 6 MILLIGRAM(S): at 17:04

## 2020-11-16 RX ADMIN — PANTOPRAZOLE SODIUM 40 MILLIGRAM(S): 20 TABLET, DELAYED RELEASE ORAL at 05:20

## 2020-11-16 RX ADMIN — ZINC SULFATE TAB 220 MG (50 MG ZINC EQUIVALENT) 220 MILLIGRAM(S): 220 (50 ZN) TAB at 12:07

## 2020-11-16 RX ADMIN — ENOXAPARIN SODIUM 40 MILLIGRAM(S): 100 INJECTION SUBCUTANEOUS at 12:07

## 2020-11-16 RX ADMIN — Medication 1 TABLET(S): at 12:07

## 2020-11-16 RX ADMIN — REMDESIVIR 500 MILLIGRAM(S): 5 INJECTION INTRAVENOUS at 17:03

## 2020-11-16 RX ADMIN — Medication 500 MILLIGRAM(S): at 12:07

## 2020-11-16 NOTE — PROGRESS NOTE ADULT - SUBJECTIVE AND OBJECTIVE BOX
Prescription approved per [facility] Refill Protocol.    Wilma SON RN  EP Triage     Patient is a 55y old  Male who presents with a chief complaint of worsening nausea, diarrhea, shortness of breath (15 Nov 2020 07:11)    Feels slightly better.  Nursing tried patient off O2 yesterday.  [place] okay but symptomatic when walking to bathroom.     Patient seen and examined at bedside.    ALLERGIES:  No Known Allergies    MEDICATIONS  (STANDING):  ascorbic acid 500 milliGRAM(s) Oral daily  dexAMETHasone  Injectable 6 milliGRAM(s) IV Push every 24 hours  enoxaparin Injectable 40 milliGRAM(s) SubCutaneous daily  influenza   Vaccine 0.5 milliLiter(s) IntraMuscular once  multivitamin 1 Tablet(s) Oral daily  pantoprazole    Tablet 40 milliGRAM(s) Oral before breakfast  remdesivir  IVPB 100 milliGRAM(s) IV Intermittent every 24 hours  remdesivir  IVPB   IV Intermittent   sodium chloride 0.9%. 1000 milliLiter(s) (75 mL/Hr) IV Continuous <Continuous>  zinc sulfate 220 milliGRAM(s) Oral daily    MEDICATIONS  (PRN):  acetaminophen   Tablet .. 650 milliGRAM(s) Oral every 6 hours PRN Temp greater or equal to 38C (100.4F), Mild Pain (1 - 3), Moderate Pain (4 - 6)    Vital Signs Last 24 Hrs  T(F): 97.6 (15 Nov 2020 20:49), Max: 98.6 (15 Nov 2020 08:57)  HR: 67 (15 Nov 2020 20:49) (55 - 87)  BP: 121/75 (15 Nov 2020 20:49) (121/75 - 128/84)  RR: 20 (15 Nov 2020 20:49) (16 - 20)  SpO2: 95% (15 Nov 2020 20:49) (95% - 97%)  I&O's Summary    15 Nov 2020 07:01  -  16 Nov 2020 07:00  --------------------------------------------------------  IN: 2350 mL / OUT: 0 mL / NET: 2350 mL    BMI (kg/m2): 32.9 (11-12-20 @ 18:37)  PHYSICAL EXAM:  General: NAD, A/O x 3  ENT: MMM, no scleral icterus  Neck: Supple, No JVD  Lungs: bilateral rhonchi  Cardio: RRR, S1/S2, No murmurs  Abdomen: Soft, Nontender, Nondistended; Bowel sounds present  Extremities: No calf tenderness, No pitting edema    LABS:             12.2   5.75  )-----------( 196      ( 15 Nov 2020 09:25 )             35.8       11-15  140  |  106  |  14  ----------------------------<  124  4.1   |  26  |  1.01    Ca    8.7      15 Nov 2020 09:25    TPro  6.5  /  Alb  2.8  /  TBili  0.2  /  DBili  x   /  AST  34  /  ALT  33  /  AlkPhos  45  11-14     eGFR if Non African American: 84 mL/min/1.73M2 (11-15-20 @ 09:25)  eGFR if : 97 mL/min/1.73M2 (11-15-20 @ 09:25)    Culture - Blood (collected 12 Nov 2020 16:10)  Source: .Blood Blood-Peripheral  Preliminary Report (13 Nov 2020 22:02):    No growth to date.    Culture - Blood (collected 12 Nov 2020 16:10)  Source: .Blood Blood-Peripheral  Preliminary Report (13 Nov 2020 22:02):    No growth to date.    RADIOLOGY & ADDITIONAL TESTS:    Care Discussed with Consultants/Other Providers:

## 2020-11-16 NOTE — PROGRESS NOTE ADULT - ASSESSMENT
LOI LIMA is a 55y year old Male with PMH of HTN who presents to the ER complaining of worsening nausea, diarrhea and shortness of breath over the last week. PE remarkable for decreased breath sounds b/l. Labs showing elevated Cr. COVID-19 positivity. Imaging as above. Admitted for COVID-19 pneumonia, respiratory failure.     COVID 19 Pneumonia   Acute Hypoxic Respiratory Failure due to COVID-19 pneumonia  - currently on 2 L NC, tried off of O2 yesterday but still with symptoms  - Monitor Oxygen saturation closely  - afebrile overnight   - Remdesivir day 5 of 5  - Decadron 6mg IV day 5 of 10  - will try ambulatory O2 today     Thrombocytopenia secondary to above - resolved    monitor platelets     Suspected Acute Kidney Injury on CKD Stage 2 - resolved  - trend BMP   - Avoid nephrotoxic medications    Hypertension  - Hold home norvasc    DVT Prophylaxis  - Lovenox 40mg sc, D-dimer wnl   - Protonix    Will discuss with wife Raz, [phone].

## 2020-11-17 LAB
ALBUMIN SERPL ELPH-MCNC: 3 G/DL — LOW (ref 3.3–5)
ALP SERPL-CCNC: 56 U/L — SIGNIFICANT CHANGE UP (ref 40–120)
ALT FLD-CCNC: 150 U/L — HIGH (ref 10–45)
ANION GAP SERPL CALC-SCNC: 10 MMOL/L — SIGNIFICANT CHANGE UP (ref 5–17)
AST SERPL-CCNC: 54 U/L — HIGH (ref 10–40)
BILIRUB SERPL-MCNC: 0.4 MG/DL — SIGNIFICANT CHANGE UP (ref 0.2–1.2)
BUN SERPL-MCNC: 18 MG/DL — SIGNIFICANT CHANGE UP (ref 7–23)
CALCIUM SERPL-MCNC: 9 MG/DL — SIGNIFICANT CHANGE UP (ref 8.4–10.5)
CHLORIDE SERPL-SCNC: 102 MMOL/L — SIGNIFICANT CHANGE UP (ref 96–108)
CO2 SERPL-SCNC: 26 MMOL/L — SIGNIFICANT CHANGE UP (ref 22–31)
CREAT SERPL-MCNC: 1.03 MG/DL — SIGNIFICANT CHANGE UP (ref 0.5–1.3)
CULTURE RESULTS: SIGNIFICANT CHANGE UP
CULTURE RESULTS: SIGNIFICANT CHANGE UP
GLUCOSE SERPL-MCNC: 143 MG/DL — HIGH (ref 70–99)
HCT VFR BLD CALC: 37 % — LOW (ref 39–50)
HGB BLD-MCNC: 12.2 G/DL — LOW (ref 13–17)
MCHC RBC-ENTMCNC: 30.3 PG — SIGNIFICANT CHANGE UP (ref 27–34)
MCHC RBC-ENTMCNC: 33 GM/DL — SIGNIFICANT CHANGE UP (ref 32–36)
MCV RBC AUTO: 92 FL — SIGNIFICANT CHANGE UP (ref 80–100)
NRBC # BLD: 0 /100 WBCS — SIGNIFICANT CHANGE UP (ref 0–0)
PLATELET # BLD AUTO: 246 K/UL — SIGNIFICANT CHANGE UP (ref 150–400)
POTASSIUM SERPL-MCNC: 4.1 MMOL/L — SIGNIFICANT CHANGE UP (ref 3.5–5.3)
POTASSIUM SERPL-SCNC: 4.1 MMOL/L — SIGNIFICANT CHANGE UP (ref 3.5–5.3)
PROT SERPL-MCNC: 6.7 G/DL — SIGNIFICANT CHANGE UP (ref 6–8.3)
RBC # BLD: 4.02 M/UL — LOW (ref 4.2–5.8)
RBC # FLD: 13.1 % — SIGNIFICANT CHANGE UP (ref 10.3–14.5)
SODIUM SERPL-SCNC: 138 MMOL/L — SIGNIFICANT CHANGE UP (ref 135–145)
SPECIMEN SOURCE: SIGNIFICANT CHANGE UP
SPECIMEN SOURCE: SIGNIFICANT CHANGE UP
WBC # BLD: 5.36 K/UL — SIGNIFICANT CHANGE UP (ref 3.8–10.5)
WBC # FLD AUTO: 5.36 K/UL — SIGNIFICANT CHANGE UP (ref 3.8–10.5)

## 2020-11-17 PROCEDURE — 99232 SBSQ HOSP IP/OBS MODERATE 35: CPT

## 2020-11-17 RX ADMIN — ENOXAPARIN SODIUM 40 MILLIGRAM(S): 100 INJECTION SUBCUTANEOUS at 11:36

## 2020-11-17 RX ADMIN — ZINC SULFATE TAB 220 MG (50 MG ZINC EQUIVALENT) 220 MILLIGRAM(S): 220 (50 ZN) TAB at 11:36

## 2020-11-17 RX ADMIN — Medication 1 TABLET(S): at 11:36

## 2020-11-17 RX ADMIN — PANTOPRAZOLE SODIUM 40 MILLIGRAM(S): 20 TABLET, DELAYED RELEASE ORAL at 05:56

## 2020-11-17 RX ADMIN — Medication 6 MILLIGRAM(S): at 17:25

## 2020-11-17 RX ADMIN — Medication 650 MILLIGRAM(S): at 18:00

## 2020-11-17 RX ADMIN — Medication 650 MILLIGRAM(S): at 17:26

## 2020-11-17 RX ADMIN — Medication 500 MILLIGRAM(S): at 11:36

## 2020-11-17 NOTE — PROGRESS NOTE ADULT - ASSESSMENT
LOI LIMA is a 55y year old Male with PMH of HTN who presents to the ER complaining of worsening nausea, diarrhea and shortness of breath over the last week. PE remarkable for decreased breath sounds b/l. Labs showing elevated Cr. COVID-19 positivity. Imaging as above. Admitted for COVID-19 pneumonia, respiratory failure.     COVID 19 Pneumonia   Acute Hypoxic Respiratory Failure due to COVID-19 pneumonia  - currently on 1 L NC - slow improvement  - Monitor Oxygen saturation closely  - afebrile overnight   - completed remdesivir 11/16/20  - Decadron 6mg IV day 6 of 10  - will try ambulatory O2 today again     Thrombocytopenia secondary to above - resolved    monitor platelets     Suspected Acute Kidney Injury on CKD Stage 2 - resolved  - trend BMP   - Avoid nephrotoxic medications    Hypertension  - Hold home norvasc - may need to resume if BP remains high    DVT Prophylaxis  - Lovenox 40mg sc, D-dimer wnl   - Protonix    Wife Raz, [phone].

## 2020-11-17 NOTE — PROGRESS NOTE ADULT - SUBJECTIVE AND OBJECTIVE BOX
Patient is a 55y old  Male who presents with a chief complaint of worsening nausea, diarrhea, shortness of breath (16 Nov 2020 07:11)    Feels a little better. able to get down to 1 L NC    Patient seen and examined at bedside.    ALLERGIES:  No Known Allergies    MEDICATIONS  (STANDING):  ascorbic acid 500 milliGRAM(s) Oral daily  dexAMETHasone  Injectable 6 milliGRAM(s) IV Push every 24 hours  enoxaparin Injectable 40 milliGRAM(s) SubCutaneous daily  influenza   Vaccine 0.5 milliLiter(s) IntraMuscular once  multivitamin 1 Tablet(s) Oral daily  pantoprazole    Tablet 40 milliGRAM(s) Oral before breakfast  zinc sulfate 220 milliGRAM(s) Oral daily    MEDICATIONS  (PRN):  acetaminophen   Tablet .. 650 milliGRAM(s) Oral every 6 hours PRN Temp greater or equal to 38C (100.4F), Mild Pain (1 - 3), Moderate Pain (4 - 6)    Vital Signs Last 24 Hrs  T(F): 98.5 (16 Nov 2020 21:50), Max: 98.5 (16 Nov 2020 21:50)  HR: 56 (16 Nov 2020 21:50) (54 - 72)  BP: 143/85 (16 Nov 2020 21:50) (119/86 - 143/85)  RR: 18 (16 Nov 2020 21:50) (18 - 23)  SpO2: 97% (16 Nov 2020 21:50) (94% - 98%)  I&O's Summary    16 Nov 2020 07:01  -  17 Nov 2020 07:00  --------------------------------------------------------  IN: 400 mL / OUT: 0 mL / NET: 400 mL    BMI (kg/m2): 32.9 (11-12-20 @ 18:37)  PHYSICAL EXAM:  General: NAD, A/O x 3  ENT: MMM, no scleral icterus  Neck: Supple, No JVD  Lungs: bilateral rhonchi  Cardio: RRR, S1/S2, No murmurs  Abdomen: Soft, Nontender, Nondistended; Bowel sounds present  Extremities: No calf tenderness, No pitting edema    LABS:                        12.2   5.36  )-----------( 246      ( 17 Nov 2020 06:51 )             37.0       11-16    140  |  106  |  14  ----------------------------<  145  4.1   |  25  |  1.00    Ca    8.3      16 Nov 2020 06:30    TPro  6.1  /  Alb  2.7  /  TBili  0.3  /  DBili  x   /  AST  85  /  ALT  141  /  AlkPhos  52  11-16     eGFR if Non African American: 85 mL/min/1.73M2 (11-16-20 @ 06:30)  eGFR if : 99 mL/min/1.73M2 (11-16-20 @ 06:30)    Culture - Blood (collected 12 Nov 2020 16:10)  Source: .Blood Blood-Peripheral  Preliminary Report (13 Nov 2020 22:02):    No growth to date.    Culture - Blood (collected 12 Nov 2020 16:10)  Source: .Blood Blood-Peripheral  Preliminary Report (13 Nov 2020 22:02):  No growth to date.    RADIOLOGY & ADDITIONAL TESTS:    Care Discussed with Consultants/Other Providers:

## 2020-11-18 VITALS
RESPIRATION RATE: 16 BRPM | TEMPERATURE: 98 F | SYSTOLIC BLOOD PRESSURE: 127 MMHG | OXYGEN SATURATION: 96 % | DIASTOLIC BLOOD PRESSURE: 80 MMHG | HEART RATE: 52 BPM

## 2020-11-18 LAB
ANION GAP SERPL CALC-SCNC: 12 MMOL/L — SIGNIFICANT CHANGE UP (ref 5–17)
BUN SERPL-MCNC: 18 MG/DL — SIGNIFICANT CHANGE UP (ref 7–23)
CALCIUM SERPL-MCNC: 9 MG/DL — SIGNIFICANT CHANGE UP (ref 8.4–10.5)
CHLORIDE SERPL-SCNC: 102 MMOL/L — SIGNIFICANT CHANGE UP (ref 96–108)
CO2 SERPL-SCNC: 25 MMOL/L — SIGNIFICANT CHANGE UP (ref 22–31)
CREAT SERPL-MCNC: 1.06 MG/DL — SIGNIFICANT CHANGE UP (ref 0.5–1.3)
GLUCOSE SERPL-MCNC: 134 MG/DL — HIGH (ref 70–99)
HCT VFR BLD CALC: 38.6 % — LOW (ref 39–50)
HGB BLD-MCNC: 13.1 G/DL — SIGNIFICANT CHANGE UP (ref 13–17)
MCHC RBC-ENTMCNC: 31 PG — SIGNIFICANT CHANGE UP (ref 27–34)
MCHC RBC-ENTMCNC: 33.9 GM/DL — SIGNIFICANT CHANGE UP (ref 32–36)
MCV RBC AUTO: 91.3 FL — SIGNIFICANT CHANGE UP (ref 80–100)
NRBC # BLD: 0 /100 WBCS — SIGNIFICANT CHANGE UP (ref 0–0)
PLATELET # BLD AUTO: 279 K/UL — SIGNIFICANT CHANGE UP (ref 150–400)
POTASSIUM SERPL-MCNC: 4.2 MMOL/L — SIGNIFICANT CHANGE UP (ref 3.5–5.3)
POTASSIUM SERPL-SCNC: 4.2 MMOL/L — SIGNIFICANT CHANGE UP (ref 3.5–5.3)
RBC # BLD: 4.23 M/UL — SIGNIFICANT CHANGE UP (ref 4.2–5.8)
RBC # FLD: 12.9 % — SIGNIFICANT CHANGE UP (ref 10.3–14.5)
SODIUM SERPL-SCNC: 139 MMOL/L — SIGNIFICANT CHANGE UP (ref 135–145)
WBC # BLD: 5.85 K/UL — SIGNIFICANT CHANGE UP (ref 3.8–10.5)
WBC # FLD AUTO: 5.85 K/UL — SIGNIFICANT CHANGE UP (ref 3.8–10.5)

## 2020-11-18 PROCEDURE — 99239 HOSP IP/OBS DSCHRG MGMT >30: CPT

## 2020-11-18 PROCEDURE — 82652 VIT D 1 25-DIHYDROXY: CPT

## 2020-11-18 PROCEDURE — 85027 COMPLETE CBC AUTOMATED: CPT

## 2020-11-18 PROCEDURE — 85379 FIBRIN DEGRADATION QUANT: CPT

## 2020-11-18 PROCEDURE — 36415 COLL VENOUS BLD VENIPUNCTURE: CPT

## 2020-11-18 PROCEDURE — 82728 ASSAY OF FERRITIN: CPT

## 2020-11-18 PROCEDURE — 84145 PROCALCITONIN (PCT): CPT

## 2020-11-18 PROCEDURE — 36600 WITHDRAWAL OF ARTERIAL BLOOD: CPT

## 2020-11-18 PROCEDURE — 85025 COMPLETE CBC W/AUTO DIFF WBC: CPT

## 2020-11-18 PROCEDURE — 86769 SARS-COV-2 COVID-19 ANTIBODY: CPT

## 2020-11-18 PROCEDURE — 80053 COMPREHEN METABOLIC PANEL: CPT

## 2020-11-18 PROCEDURE — 93005 ELECTROCARDIOGRAM TRACING: CPT

## 2020-11-18 PROCEDURE — 87040 BLOOD CULTURE FOR BACTERIA: CPT

## 2020-11-18 PROCEDURE — 96365 THER/PROPH/DIAG IV INF INIT: CPT

## 2020-11-18 PROCEDURE — 80076 HEPATIC FUNCTION PANEL: CPT

## 2020-11-18 PROCEDURE — 71045 X-RAY EXAM CHEST 1 VIEW: CPT

## 2020-11-18 PROCEDURE — 84484 ASSAY OF TROPONIN QUANT: CPT

## 2020-11-18 PROCEDURE — 83880 ASSAY OF NATRIURETIC PEPTIDE: CPT

## 2020-11-18 PROCEDURE — 99291 CRITICAL CARE FIRST HOUR: CPT | Mod: 25

## 2020-11-18 PROCEDURE — 85652 RBC SED RATE AUTOMATED: CPT

## 2020-11-18 PROCEDURE — 86140 C-REACTIVE PROTEIN: CPT

## 2020-11-18 PROCEDURE — 87635 SARS-COV-2 COVID-19 AMP PRB: CPT

## 2020-11-18 PROCEDURE — 82803 BLOOD GASES ANY COMBINATION: CPT

## 2020-11-18 PROCEDURE — 83605 ASSAY OF LACTIC ACID: CPT

## 2020-11-18 PROCEDURE — 80048 BASIC METABOLIC PNL TOTAL CA: CPT

## 2020-11-18 PROCEDURE — 96361 HYDRATE IV INFUSION ADD-ON: CPT

## 2020-11-18 RX ORDER — DEXAMETHASONE 0.5 MG/5ML
6 ELIXIR ORAL ONCE
Refills: 0 | Status: COMPLETED | OUTPATIENT
Start: 2020-11-18 | End: 2020-11-18

## 2020-11-18 RX ORDER — AMLODIPINE BESYLATE 2.5 MG/1
0 TABLET ORAL
Qty: 0 | Refills: 0 | DISCHARGE

## 2020-11-18 RX ORDER — DEXAMETHASONE 0.5 MG/5ML
1 ELIXIR ORAL
Qty: 3 | Refills: 0
Start: 2020-11-18 | End: 2020-11-20

## 2020-11-18 RX ADMIN — Medication 6 MILLIGRAM(S): at 10:29

## 2020-11-18 RX ADMIN — PANTOPRAZOLE SODIUM 40 MILLIGRAM(S): 20 TABLET, DELAYED RELEASE ORAL at 05:20

## 2020-11-18 NOTE — DISCHARGE NOTE NURSING/CASE MANAGEMENT/SOCIAL WORK - PATIENT PORTAL LINK FT
You can access the FollowMyHealth Patient Portal offered by [facility] by registering at the following website: [email or web address]. By joining [facility]’s FollowMyHealth portal, you will also be able to view your health information using other applications (apps) compatible with our system.

## 2020-11-18 NOTE — PROGRESS NOTE ADULT - SUBJECTIVE AND OBJECTIVE BOX
Patient is a 55y old  Male who presents with a chief complaint of worsening nausea, diarrhea, shortness of breath (17 Nov 2020 07:16)    Feels better.  off oxygen overnight and slept okay.     Patient seen and examined at bedside.    ALLERGIES:  No Known Allergies    MEDICATIONS  (STANDING):  ascorbic acid 500 milliGRAM(s) Oral daily  dexAMETHasone  Injectable 6 milliGRAM(s) IV Push every 24 hours  enoxaparin Injectable 40 milliGRAM(s) SubCutaneous daily  influenza   Vaccine 0.5 milliLiter(s) IntraMuscular once  multivitamin 1 Tablet(s) Oral daily  pantoprazole    Tablet 40 milliGRAM(s) Oral before breakfast  zinc sulfate 220 milliGRAM(s) Oral daily    MEDICATIONS  (PRN):  acetaminophen   Tablet .. 650 milliGRAM(s) Oral every 6 hours PRN Temp greater or equal to 38C (100.4F), Mild Pain (1 - 3), Moderate Pain (4 - 6)    Vital Signs Last 24 Hrs  T(F): 98.3 (17 Nov 2020 21:32), Max: 98.5 (17 Nov 2020 09:01)  HR: 53 (17 Nov 2020 21:32) (53 - 58)  BP: 134/85 (17 Nov 2020 21:32) (128/81 - 134/85)  RR: 16 (17 Nov 2020 21:32) (16 - 17)  SpO2: 100% (17 Nov 2020 21:32) (96% - 100%)  I&O's Summary    17 Nov 2020 07:01  -  18 Nov 2020 07:00  --------------------------------------------------------  IN: 700 mL / OUT: 0 mL / NET: 700 mL        PHYSICAL EXAM:  General: NAD, A/O x 3  ENT: MMM, no scleral icterus  Neck: Supple, No JVD  Lungs: bilateral basilar rhonchi  Cardio: RRR, S1/S2, No murmurs  Abdomen: Soft, Nontender, Nondistended; Bowel sounds present  Extremities: No calf tenderness, No pitting edema    LABS:                        12.2   5.36  )-----------( 246      ( 17 Nov 2020 06:51 )             37.0       11-17    138  |  102  |  18  ----------------------------<  143  4.1   |  26  |  1.03    Ca    9.0      17 Nov 2020 06:51    TPro  6.7  /  Alb  3.0  /  TBili  0.4  /  DBili  x   /  AST  54  /  ALT  150  /  AlkPhos  56  11-17     eGFR if Non African American: 82 mL/min/1.73M2 (11-17-20 @ 06:51)  eGFR if African American: 95 mL/min/1.73M2 (11-17-20 @ 06:51)    Culture - Blood (collected 12 Nov 2020 16:10)  Source: .Blood Blood-Peripheral  Final Report (17 Nov 2020 22:00):    No Growth Final    Culture - Blood (collected 12 Nov 2020 16:10)  Source: .Blood Blood-Peripheral  Final Report (17 Nov 2020 22:00):    No Growth Final    RADIOLOGY & ADDITIONAL TESTS:    Care Discussed with Consultants/Other Providers:

## 2020-11-18 NOTE — PROGRESS NOTE ADULT - ASSESSMENT
LOI LIMA is a 55y year old Male with PMH of HTN who presents to the ER complaining of worsening nausea, diarrhea and shortness of breath over the last week. PE remarkable for decreased breath sounds b/l. Labs showing elevated Cr. COVID-19 positivity. Imaging as above. Admitted for COVID-19 pneumonia, respiratory failure.     COVID 19 Pneumonia  Acute Hypoxic Respiratory Failure due to COVID-19 pneumonia - resolving  - currently of O2 - check ambulatory pulse oximetry  - afebrile overnight   - completed remdesivir 11/16/20  - Decadron 6mg IV day 7 of 10 - likely change to PO and discharge home if ambulatory pulse ox okay     Thrombocytopenia secondary to above - resolved   monitor platelets    Suspected Acute Kidney Injury on CKD Stage 2 - resolved  - trend BMP  - Avoid nephrotoxic medications    Hypertension  - follow up with PCP for resuming norvasc    DVT Prophylaxis  - Lovenox 40mg sc, D-dimer wnl   - Protonix    Wife Raz, [phone]

## 2020-11-18 NOTE — DISCHARGE NOTE PROVIDER - HOSPITAL COURSE
Hospital Course  HPI:  LOI LIMA is a 55y year old Male with PMH of HTN who presents to the ER complaining of worsening nausea, diarrhea and shortness of breath over the last week.    Patient reports symptoms started 11/3 while at work, suddenly felt fatigued and had chills. Symptoms worsened the next day and he went to get tested for COVID-19 which resulted positive. Since then he has been home away from friends, in a separate room from wife and 2 kids. Has been monitoring saturation at home with pulse ox, today it fell in low 90s which prompted him to seek medical attention. Reports no reduction in fevers since onset.  (12 Nov 2020 17:15)    Patient admitted for COVID 19 pneumonia with acute hypoxic respiratory failure.  Patient received full course of remdesivir and will continue decadron for 10 day course.    You were admitted for COVID 19 pneumonia.   You were treated with Remdesivir and Decadron.  You were prescribed the following new medications:  Decadron.     You will need to follow up with your primary care physician.    Discharging Provider:  Preston Morin MD  Contact Info: Cell [phone] - Please call with any questions or concerns.    Outpatient Provider: Dr. Moore       Hospital Course  HPI:  LOI LIMA is a 55y year old Male with PMH of HTN who presents to the ER complaining of worsening nausea, diarrhea and shortness of breath over the last week.    Patient reports symptoms started 11/3 while at work, suddenly felt fatigued and had chills. Symptoms worsened the next day and he went to get tested for COVID-19 which resulted positive. Since then he has been home away from friends, in a separate room from wife and 2 kids. Has been monitoring saturation at home with pulse ox, today it fell in low 90s which prompted him to seek medical attention. Reports no reduction in fevers since onset.  (12 Nov 2020 17:15)    Patient admitted for COVID 19 pneumonia with acute hypoxic respiratory failure.  Patient received full course of remdesivir and will continue decadron for 10 day course.    You were admitted for COVID 19 pneumonia.   You were treated with Remdesivir and Decadron.  You were prescribed the following new medications:  Decadron.     You will need to follow up with your primary care physician.    Discharging Provider:  Preston Morin MD  Contact Info: Cell [phone] - Please call with any questions or concerns.    Outpatient Provider: Dr. Kajal Moore - notified [phone]

## 2020-11-18 NOTE — DISCHARGE NOTE PROVIDER - NSDCCPCAREPLAN_GEN_ALL_CORE_FT
PRINCIPAL DISCHARGE DIAGNOSIS  Diagnosis: COVID-19  Assessment and Plan of Treatment: You were admitted for COVID 19 pneumonia.   You were treated with Remdesivir and Decadron.  You were prescribed the following new medications:  Decadron until 11/21/20  You will need to follow up with your primary care physician.      SECONDARY DISCHARGE DIAGNOSES  Diagnosis: Dyspnea, unspecified type  Assessment and Plan of Treatment:     Diagnosis: Hypoxia  Assessment and Plan of Treatment:

## 2021-03-05 NOTE — PATIENT PROFILE ADULT - CHOOSE INDICATION TO IMMUNIZE (AN ORDER WILL BE GENERATED WHEN THIS NOTE IS SAVED):
Medical Necessity Information: It is in your best interest to select a reason for this procedure from the list below. All of these items fulfill various CMS LCD requirements except the new and changing color options. Medical Necessity Clause: This procedure was medically necessary because the lesion that was treated was: Lab: -448 Body Location Override (Optional - Billing Will Still Be Based On Selected Body Map Location If Applicable): left posterior proximal humerus Detail Level: Detailed Was A Bandage Applied: Yes Size Of Lesion In Cm (Required): 0.5 Biopsy Method: Personna blade Anesthesia Type: 1% lidocaine with epinephrine Anesthesia Volume In Cc: 0.3 Hemostasis: Drysol Patient is not pregnant (male or female) Wound Care: Bacitracin Path Notes (To The Dermatopathologist): Size: 0.5x0.5cm \\nR/O: DN vs mm Render Path Notes In Note?: No Consent was obtained from the patient. The risks and benefits to therapy were discussed in detail. Specifically, the risks of infection, scarring, bleeding, prolonged wound healing, incomplete removal, allergy to anesthesia, nerve injury and recurrence were addressed. Prior to the procedure, the treatment site was clearly identified and confirmed by the patient. All components of Universal Protocol/PAUSE Rule completed. Post-Care Instructions: I reviewed with the patient in detail post-care instructions. Patient is to keep the biopsy site dry overnight, and then apply bacitracin twice daily until healed. Patient may apply hydrogen peroxide soaks to remove any crusting. Notification Instructions: Patient will be notified of biopsy results. However, patient instructed to call the office if not contacted within 2 weeks. Billing Type: [employer]

## 2021-08-11 NOTE — ED PROVIDER NOTE - RESPIRATORY [+], MLM
[Time Spent: ___ minutes] : I have spent [unfilled] minutes of time on the encounter. [>50% of the face to face encounter time was spent on counseling and/or coordination of care for ___] : Greater than 50% of the face to face encounter time was spent on counseling and/or coordination of care for [unfilled] SHORTNESS OF BREATH/COUGH

## 2023-07-17 NOTE — ED ADULT NURSE NOTE - NS ED NURSE REPORT GIVEN DT
From: Frederick Quiros  To: Rachel Falk  Sent: 7/16/2023 7:56 AM CDT  Subject: Any availability tomorrow 7/17?    This message is being sent by Behzad Quiros on behalf of Frederick Quiros.    Good morning, Rachel.     Frederick is really struggling again. Gradually, I’ve been noticing changes in his behavior these past 4-6 weeks. Periodically, I’ve been asking him if he would like to go back in his medication and/or speak with you sooner. He has been declining until last night. I am hoping we could meet with you tomorrow via zoom. Frederick did mention to me that he didn’t like the way the medicine made him feel. He wasn’t very descriptive in his response but maybe he can describe it more for you when you talk to him. He said he felt better soon after he went off the medication. I explained to him that the medication was probably still in his system and that’s probably why he continued to feel OK.    I look forward to hearing from you. Thank you very much!    Behzad Quiros   12-Nov-2020 12-Nov-2020 18:08